# Patient Record
Sex: MALE | Race: WHITE | NOT HISPANIC OR LATINO | Employment: UNEMPLOYED | URBAN - METROPOLITAN AREA
[De-identification: names, ages, dates, MRNs, and addresses within clinical notes are randomized per-mention and may not be internally consistent; named-entity substitution may affect disease eponyms.]

---

## 2023-11-03 ENCOUNTER — OFFICE VISIT (OUTPATIENT)
Dept: FAMILY MEDICINE | Facility: CLINIC | Age: 23
End: 2023-11-03
Payer: COMMERCIAL

## 2023-11-03 VITALS
DIASTOLIC BLOOD PRESSURE: 88 MMHG | HEIGHT: 67 IN | HEART RATE: 72 BPM | OXYGEN SATURATION: 95 % | TEMPERATURE: 98 F | BODY MASS INDEX: 30.86 KG/M2 | WEIGHT: 196.6 LBS | SYSTOLIC BLOOD PRESSURE: 116 MMHG | RESPIRATION RATE: 16 BRPM

## 2023-11-03 DIAGNOSIS — Z13.220 LIPID SCREENING: ICD-10-CM

## 2023-11-03 DIAGNOSIS — F90.1 ATTENTION DEFICIT HYPERACTIVITY DISORDER (ADHD), PREDOMINANTLY HYPERACTIVE TYPE: ICD-10-CM

## 2023-11-03 DIAGNOSIS — Z13.1 SCREENING FOR DIABETES MELLITUS: ICD-10-CM

## 2023-11-03 DIAGNOSIS — F84.0 ACTIVE AUTISTIC DISORDER: ICD-10-CM

## 2023-11-03 DIAGNOSIS — Z00.00 ROUTINE GENERAL MEDICAL EXAMINATION AT A HEALTH CARE FACILITY: Primary | ICD-10-CM

## 2023-11-03 PROCEDURE — 99385 PREV VISIT NEW AGE 18-39: CPT | Performed by: PHYSICIAN ASSISTANT

## 2023-11-03 PROCEDURE — 99203 OFFICE O/P NEW LOW 30 MIN: CPT | Mod: 25 | Performed by: PHYSICIAN ASSISTANT

## 2023-11-03 RX ORDER — METHYLPHENIDATE 2.2 MG/H
1 PATCH TRANSDERMAL DAILY
Qty: 30 PATCH | Refills: 0 | Status: SHIPPED | OUTPATIENT
Start: 2023-11-03 | End: 2023-12-03

## 2023-11-03 RX ORDER — METHYLPHENIDATE 2.2 MG/H
1 PATCH TRANSDERMAL DAILY
Qty: 30 PATCH | Refills: 0 | Status: SHIPPED | OUTPATIENT
Start: 2023-12-04 | End: 2024-01-03

## 2023-11-03 RX ORDER — METHYLPHENIDATE 2.2 MG/H
1 PATCH TRANSDERMAL DAILY
COMMUNITY
Start: 2021-11-15 | End: 2024-01-08

## 2023-11-03 ASSESSMENT — ENCOUNTER SYMPTOMS
CONSTIPATION: 1
NAUSEA: 0
HEADACHES: 0
ARTHRALGIAS: 0
FEVER: 0
SHORTNESS OF BREATH: 0
DIARRHEA: 0
HEMATOCHEZIA: 0
FREQUENCY: 1
EYE PAIN: 0
JOINT SWELLING: 0
HEMATURIA: 0
WEAKNESS: 0
PALPITATIONS: 0
DYSURIA: 0
COUGH: 0
PARESTHESIAS: 0
ABDOMINAL PAIN: 0
NERVOUS/ANXIOUS: 0
DIZZINESS: 0
SORE THROAT: 0
CHILLS: 1
MYALGIAS: 0
HEARTBURN: 0

## 2023-11-03 ASSESSMENT — PAIN SCALES - GENERAL: PAINLEVEL: NO PAIN (0)

## 2023-11-03 NOTE — PATIENT INSTRUCTIONS
Schedule lab only visit as well as vaccines     Follow up with psychiatry for further refills of Daytrana.          Preventive Health Recommendations  Male Ages 21 - 25     Yearly exam:             See your health care provider every year in order to  o   Review health changes.   o   Discuss preventive care.    o   Review your medicines if your doctor has prescribed any.  You should be tested each year for STDs (sexually transmitted diseases).   Talk to your provider about cholesterol testing.    If you are at risk for diabetes, you should have a diabetes test (fasting glucose).    Shots: Get a flu shot each year. Get a tetanus shot every 10 years.     Nutrition:  Eat at least 5 servings of fruits and vegetables daily.   Eat whole-grain bread, whole-wheat pasta and brown rice instead of white grains and rice.   Get adequate calcium and Vitamin D.     Lifestyle  Exercise for at least 150 minutes a week (30 minutes a day, 5 days a week). This will help you control your weight and prevent disease.   Limit alcohol to one drink per day.   No smoking.   Wear sunscreen to prevent skin cancer.   See your dentist every six months for an exam and cleaning.

## 2023-11-03 NOTE — PROGRESS NOTES
"SUBJECTIVE:   CC: Oscar is an 23 year old who presents for preventative health visit.     Healthy Habits:     Getting at least 3 servings of Calcium per day:  NO    Bi-annual eye exam:  Yes    Dental care twice a year:  NO    Sleep apnea or symptoms of sleep apnea:  Daytime drowsiness    Diet:  Regular (no restrictions)    Frequency of exercise:  6-7 days/week    Duration of exercise:  45-60 minutes    Taking medications regularly:  Yes    Medication side effects:  None    Additional concerns today:  No    Patient with history of autism.  Presents with his father.  Mother was on the telephone during visit.    Previously following with psychiatry Dr. Cruz.  Has been on Daytrana patch 20 mg daily for ADHD.  Has appointment with his psychiatry group in the next month.  Does need refill of medications.    Caffeine consuming 4-5 20 ounce sodas per day.     Daytime drowsiness - possibly poor sleeping. No everyday.     Works at [employer].  Today's PHQ-2 Score:       11/3/2023     8:03 AM   PHQ-2 ( 1999 [employer])   Q1: Little interest or pleasure in doing things 0   Q2: Feeling down, depressed or hopeless 0   PHQ-2 Score 0   Q1: Little interest or pleasure in doing things Not at all   Q2: Feeling down, depressed or hopeless Not at all   PHQ-2 Score 0     Have you ever done Advance Care Planning? (For example, a Health Directive, POLST, or a discussion with a medical provider or your loved ones about your wishes): No, advance care planning information given to patient to review.  Patient plans to discuss their wishes with loved ones or provider.      Social History     Tobacco Use    Smoking status: Never    Smokeless tobacco: Never   Substance Use Topics    Alcohol use: Not on file             11/3/2023     8:03 AM   Alcohol Use   Prescreen: >3 drinks/day or >7 drinks/week? No     Last PSA: No results found for: \"PSA\"    Reviewed orders with patient. Reviewed health maintenance and updated orders accordingly -       Reviewed " "and updated as needed this visit by clinical staff   Tobacco  Allergies  Meds              Reviewed and updated as needed this visit by Provider                 History reviewed. No pertinent past medical history.   History reviewed. No pertinent surgical history.    Review of Systems   Constitutional:  Positive for chills. Negative for fever.   HENT:  Negative for congestion, ear pain, hearing loss and sore throat.    Eyes:  Negative for pain and visual disturbance.   Respiratory:  Negative for cough and shortness of breath.    Cardiovascular:  Positive for peripheral edema. Negative for chest pain and palpitations.   Gastrointestinal:  Positive for constipation. Negative for abdominal pain, diarrhea, heartburn, hematochezia and nausea.   Genitourinary:  Positive for frequency. Negative for dysuria, genital sores, hematuria, impotence, penile discharge and urgency.   Musculoskeletal:  Negative for arthralgias, joint swelling and myalgias.   Skin:  Negative for rash.   Neurological:  Negative for dizziness, weakness, headaches and paresthesias.   Psychiatric/Behavioral:  Negative for mood changes. The patient is not nervous/anxious.      OBJECTIVE:   /88   Pulse 72   Temp 98  F (36.7  C) (Tympanic)   Resp 16   Ht 1.689 m (5' 6.5\")   Wt 89.2 kg (196 lb 9.6 oz)   SpO2 95%   BMI 31.26 kg/m      Physical Exam  GENERAL: alert, no distress, and over weight  EYES: Eyes grossly normal to inspection, PERRL and conjunctivae and sclerae normal  HENT: ear canals and TM's normal, nose and mouth without ulcers or lesions  NECK: no adenopathy, no asymmetry, masses, or scars and thyroid normal to palpation  RESP: lungs clear to auscultation - no rales, rhonchi or wheezes  CV: regular rate and rhythm, normal S1 S2, no S3 or S4, no murmur, click or rub, no peripheral edema and peripheral pulses strong  ABDOMEN: soft, nontender, no hepatosplenomegaly, no masses and bowel sounds normal  MS: no gross musculoskeletal " defects noted, no edema  SKIN: no suspicious lesions or rashes  NEURO: Normal strength and tone, mentation intact and speech normal  PSYCH: mentation appears normal, affect normal/bright      ASSESSMENT/PLAN:   (Z00.00) Routine general medical examination at a health care facility  (primary encounter diagnosis)  Comment: Here for routine screening examination.  Plan: Comprehensive metabolic panel (BMP + Alb, Alk         Phos, ALT, AST, Total. Bili, TP), CBC with         platelets and differential          (F90.1) Attention deficit hyperactivity disorder (ADHD), predominantly hyperactive type  Comment: History of ADHD.  On methylphenidate patch.  Had been prescribed by his psychiatry team, however, patient has not been able to get in with his psychiatrist.  Reviewed with patient and prescription monitoring database.  Discussed with patient as well as parents at length that he needs to follow with 1 provider for controlled substances moving forward.  They will continue follow-up with psychiatry.  Plan: methylphenidate (DAYTRANA) 20 MG/9HR patch,         methylphenidate (DAYTRANA) 20 MG/9HR patch          (F84.0) Active autistic disorder  Comment: History of autism.    (Z13.1) Screening for diabetes mellitus  Comment: Patient does consume 4 to 5  20 ounce sodas per day.  Discussed with patient eliminating soda consumption.  We will do hemoglobin A1c, however, patient is apprehensive for any blood draws or immunizations.  He will come back for screening labs.  Plan: Hemoglobin A1c          (Z13.220) Lipid screening  Comment: Discussed lipid screening.  Plan: Lipid panel reflex to direct LDL Non-fasting          Patient has been advised of split billing requirements and indicates understanding: Yes      COUNSELING:   Reviewed preventive health counseling, as reflected in patient instructions       Regular exercise       Healthy diet/nutrition       Vision screening       Alcohol Use       BMI:   Estimated body mass index  "is 31.26 kg/m  as calculated from the following:    Height as of this encounter: 1.689 m (5' 6.5\").    Weight as of this encounter: 89.2 kg (196 lb 9.6 oz).   Weight management plan: Discussed healthy diet and exercise guidelines      He reports that he has never smoked. He has never used smokeless tobacco.            Humberto Araujo PA-C  [facility]  "

## 2024-01-02 ENCOUNTER — TELEPHONE (OUTPATIENT)
Dept: INTERNAL MEDICINE | Facility: CLINIC | Age: 24
End: 2024-01-02
Payer: COMMERCIAL

## 2024-01-02 NOTE — TELEPHONE ENCOUNTER
Please call patient/family.  Patient on my schedule as video visit tomorrow for med check. I have not seen him before.  He had a visit with Renny Araujo last month and was told that medication needs to continue to come from psychiatry.  If appointment with me is supposed to be for the adhd medication, this needs to come from psychiatry. Shayna Barros, CNP

## 2024-01-02 NOTE — TELEPHONE ENCOUNTER
Tried calling patient, voicemail is full, I could not leave a message.Dora Barnes [facility]

## 2024-01-03 NOTE — TELEPHONE ENCOUNTER
Tried calling again, no answer and no option to leave voicemail. Provider will inform patient at visit.      Ezequiel Walker

## 2024-01-08 ENCOUNTER — TELEPHONE (OUTPATIENT)
Dept: FAMILY MEDICINE | Facility: CLINIC | Age: 24
End: 2024-01-08

## 2024-01-08 ENCOUNTER — OFFICE VISIT (OUTPATIENT)
Dept: FAMILY MEDICINE | Facility: CLINIC | Age: 24
End: 2024-01-08
Payer: COMMERCIAL

## 2024-01-08 VITALS
RESPIRATION RATE: 16 BRPM | TEMPERATURE: 97.4 F | DIASTOLIC BLOOD PRESSURE: 82 MMHG | HEIGHT: 67 IN | OXYGEN SATURATION: 96 % | SYSTOLIC BLOOD PRESSURE: 131 MMHG | WEIGHT: 192.8 LBS | HEART RATE: 65 BPM | BODY MASS INDEX: 30.26 KG/M2

## 2024-01-08 DIAGNOSIS — F84.0 AUTISM: Primary | ICD-10-CM

## 2024-01-08 DIAGNOSIS — F42.9 OBSESSIVE-COMPULSIVE DISORDER, UNSPECIFIED TYPE: ICD-10-CM

## 2024-01-08 DIAGNOSIS — Z23 NEED FOR TETANUS BOOSTER: ICD-10-CM

## 2024-01-08 DIAGNOSIS — F90.1 ATTENTION DEFICIT HYPERACTIVITY DISORDER (ADHD), PREDOMINANTLY HYPERACTIVE TYPE: ICD-10-CM

## 2024-01-08 DIAGNOSIS — F81.9 COGNITIVE DEVELOPMENTAL DELAY: ICD-10-CM

## 2024-01-08 PROCEDURE — 90715 TDAP VACCINE 7 YRS/> IM: CPT | Performed by: PHYSICIAN ASSISTANT

## 2024-01-08 PROCEDURE — 90471 IMMUNIZATION ADMIN: CPT | Performed by: PHYSICIAN ASSISTANT

## 2024-01-08 PROCEDURE — 99214 OFFICE O/P EST MOD 30 MIN: CPT | Mod: 25 | Performed by: PHYSICIAN ASSISTANT

## 2024-01-08 RX ORDER — METHYLPHENIDATE 2.2 MG/H
1 PATCH TRANSDERMAL DAILY
Qty: 30 PATCH | Refills: 0 | Status: SHIPPED | OUTPATIENT
Start: 2024-01-08 | End: 2024-02-12

## 2024-01-08 ASSESSMENT — PAIN SCALES - GENERAL: PAINLEVEL: NO PAIN (0)

## 2024-01-08 NOTE — TELEPHONE ENCOUNTER
Prior Authorization Retail Medication Request    Medication/Dose: Methylphenidate 20mg/9hr patch  Diagnosis and ICD code (if different than what is on RX):  CHESTER  New/renewal/insurance change PA/secondary ins. PA:  Previously Tried and Failed:  CHESTER  Rationale:  NA    Insurance   Primary: [employer]  Insurance ID:  [id number]    Secondary (if applicable):CHESTER  Insurance ID:  CHESTER    Pharmacy Information (if different than what is on RX)  Name:  [facility]  Phone:  [phone]  Fax:[phone]

## 2024-01-08 NOTE — PROGRESS NOTES
Assessment & Plan     (F84.0) Autism  (primary encounter diagnosis)  Comment: History of autism.  Plan: Adult Mental Health  Referral          (F90.1) Attention deficit hyperactivity disorder (ADHD), predominantly hyperactive type  Comment: History of ADHD.  Previously following with [facility].  Mother has been frustrated with the care they received through [facility].  Currently using methylphenidate patch 20 mg.  They are concerned about potential wear off, however, he describes this concern as being more argumentative and anger outbursts.  Discussed with mother staying with psychiatry for titration of medications.  Offered psychiatry referral through [facility].  Offered a short course refill of methylphenidate until they can establish with psychiatry.  Plan: Adult Mental Health  Referral,         methylphenidate (DAYTRANA) 20 MG/9HR patch          (F42.9) Obsessive-compulsive disorder, unspecified type  Comment: Patient with history of obsessive-compulsive disorder.  I do not have access to the records from [facility].  Unclear prior diagnoses for the patient.  Plan: Adult Mental Health  Referral          (F81.9) Cognitive developmental delay  Comment: History of cognitive delay.  Plan: Adult Mental Health  Referral          (Z23) Need for tetanus booster  Comment: Discussed need for tetanus booster.  Patient was very apprehensive for any vaccine or needle.  Took multiple staff members an extended period of time to have 1 vaccine administered.    Humberto Araujo PA-C  [facility] KOMAL Moore is a 23 year old, presenting for the following health issues:  Medication Request      History of Present Illness       Reason for visit:  ADHD follow up      Patient with history of OCD, ADHD, autism, developmental delay presents with mother for discussion of ADHD.  Followed with Dr. Cruz through [facility].  Has been using Daytrana patch  "20 mg that has been placed at 7 AM typically.  Mother is concerned as she feels as though this is wearing off later in the day.  During this time the patient becomes more argumentative and has more anger outbursts.  He has had some trouble sleeping and has been using 1 mg melatonin as needed.  Does have issues with staying focused at times.  He has a varied workday schedule occasionally 10-3, 9-5, 10 - 8.  Continues consuming caffeine. Family has been frustrated with the care they had received from [facility] and [facility] and would like to transition to [facility].    Review of Systems         Objective    /82   Pulse 65   Temp 97.4  F (36.3  C) (Tympanic)   Resp 16   Ht 1.689 m (5' 6.5\")   Wt 87.5 kg (192 lb 12.8 oz)   SpO2 96%   BMI 30.65 kg/m    Body mass index is 30.65 kg/m .  Physical Exam   GENERAL: healthy, alert and no distress  RESP: lungs clear to auscultation - no rales, rhonchi or wheezes  CV: regular rate and rhythm, normal S1 S2, no S3 or S4, no murmur, click or rub, no peripheral edema and peripheral pulses strong  MS: no gross musculoskeletal defects noted, no edema  PSYCH: mentation appears normal, affect normal/bright                      "

## 2024-01-11 NOTE — TELEPHONE ENCOUNTER
PRIOR AUTHORIZATION DENIED    Medication: DAYTRANA 20 MG/9HR TD [facility]    Insurance Company: [employer] - Phone [phone] Fax [phone]    Denial Date: 1/11/2024    Denial Reason(s): Excluded

## 2024-01-12 NOTE — TELEPHONE ENCOUNTER
Called and spoke to patient's mom, Naz. She said that they pay out of pocket for this medication.Dora Barnes [facility]

## 2024-01-12 NOTE — TELEPHONE ENCOUNTER
Please contact patient/family.     Needs to follow with psychiatry for changes in medications.     Insurance is not covering patch that I prescribed. Recommend following with with psychiatrist as discussed.     Humberto Araujo PA-C

## 2024-02-12 ENCOUNTER — TELEPHONE (OUTPATIENT)
Dept: FAMILY MEDICINE | Facility: CLINIC | Age: 24
End: 2024-02-12
Payer: COMMERCIAL

## 2024-02-12 DIAGNOSIS — F90.1 ATTENTION DEFICIT HYPERACTIVITY DISORDER (ADHD), PREDOMINANTLY HYPERACTIVE TYPE: ICD-10-CM

## 2024-02-12 RX ORDER — METHYLPHENIDATE 2.2 MG/H
1 PATCH TRANSDERMAL DAILY
Qty: 30 PATCH | Refills: 0 | Status: SHIPPED | OUTPATIENT
Start: 2024-02-12

## 2024-02-12 NOTE — TELEPHONE ENCOUNTER
Please contact.     Single refill provided.  Needs to make appointment with psychiatry for further refills.    Humberto Araujo PA-C

## 2024-02-12 NOTE — TELEPHONE ENCOUNTER
.Prior Authorization Retail Medication Request    Medication/Dose: Methylphenidate 20mg/9hr requires a Prior Auth  Diagnosis and ICD code (if different than what is on RX):  N/A  New/renewal/insurance change PA/secondary ins. PA:  Previously Tried and Failed:  N/A  Rationale:  N/A    Insurance   Primary: [employer]  Insurance ID:  [id number]    Secondary (if applicable):N/A  Insurance ID:  N/A    Pharmacy Information (if different than what is on RX)  Name:  [facility] [place]  Phone:  [phone]  Fax:[phone]

## 2024-02-26 NOTE — TELEPHONE ENCOUNTER
PRIOR AUTHORIZATION DENIED    Medication: METHYLPHENIDATE 20 MG/9HR TD [facility]    Insurance Company: [employer] - Phone [phone] Fax [phone]    Denial Date: 2/25/2024    Denial Reason(s): Excluded

## 2024-02-27 NOTE — TELEPHONE ENCOUNTER
Called and spoke to patient, mom, consent to communicate on file. I informed her of Renny's message. I offered to give her the phone number to set up psych visit, she said she will get it later, as she was driving. They do pay out of pocket for this RX.Dora Barnes [facility]

## 2024-02-27 NOTE — TELEPHONE ENCOUNTER
Please contact.     Needs to schedule with psychiatry as discussed. Patient has not scheduled yet with psychiatry. Letter had been mailed prior. (See below) once scheduled can refill.     Dear Oscar,    We are contacting you on behalf of your [facility] Team, who referred you for outpatient mental health and addiction services.     We have attempted to call you by phone to schedule an appointment, but did not reach you.  Please call [phone] to schedule an appointment.    If you have any questions about the reason for the referral, please contact your care team.        Humberto Araujo PA-C